# Patient Record
Sex: FEMALE | HISPANIC OR LATINO | URBAN - METROPOLITAN AREA
[De-identification: names, ages, dates, MRNs, and addresses within clinical notes are randomized per-mention and may not be internally consistent; named-entity substitution may affect disease eponyms.]

---

## 2019-03-24 ENCOUNTER — OUTPATIENT (OUTPATIENT)
Dept: INPATIENT UNIT | Facility: HOSPITAL | Age: 34
LOS: 1 days | Discharge: HOME | End: 2019-03-24
Payer: MEDICAID

## 2019-03-24 LAB
APPEARANCE UR: CLEAR — SIGNIFICANT CHANGE UP
BACTERIA # UR AUTO: ABNORMAL
BILIRUB UR-MCNC: NEGATIVE — SIGNIFICANT CHANGE UP
COLOR SPEC: YELLOW — SIGNIFICANT CHANGE UP
DIFF PNL FLD: ABNORMAL
EPI CELLS # UR: ABNORMAL
GLUCOSE UR QL: NEGATIVE MG/DL — SIGNIFICANT CHANGE UP
KETONES UR-MCNC: NEGATIVE — SIGNIFICANT CHANGE UP
LEUKOCYTE ESTERASE UR-ACNC: ABNORMAL
NITRITE UR-MCNC: NEGATIVE — SIGNIFICANT CHANGE UP
PH UR: 7 — SIGNIFICANT CHANGE UP (ref 5–8)
PROT UR-MCNC: NEGATIVE MG/DL — SIGNIFICANT CHANGE UP
RBC CASTS # UR COMP ASSIST: SIGNIFICANT CHANGE UP /HPF (ref 0–4)
SP GR SPEC: 1.01 — SIGNIFICANT CHANGE UP (ref 1.01–1.02)
UROBILINOGEN FLD QL: NEGATIVE MG/DL — SIGNIFICANT CHANGE UP
WBC UR QL: SIGNIFICANT CHANGE UP

## 2019-03-24 PROCEDURE — 99213 OFFICE O/P EST LOW 20 MIN: CPT

## 2019-04-05 DIAGNOSIS — O47.9 FALSE LABOR, UNSPECIFIED: ICD-10-CM

## 2019-04-08 ENCOUNTER — RESULT REVIEW (OUTPATIENT)
Age: 34
End: 2019-04-08

## 2019-04-16 PROBLEM — Z00.00 ENCOUNTER FOR PREVENTIVE HEALTH EXAMINATION: Status: ACTIVE | Noted: 2019-04-16

## 2019-04-19 ENCOUNTER — ASOB RESULT (OUTPATIENT)
Age: 34
End: 2019-04-19

## 2019-04-19 ENCOUNTER — APPOINTMENT (OUTPATIENT)
Dept: ANTEPARTUM | Facility: CLINIC | Age: 34
End: 2019-04-19
Payer: MEDICAID

## 2019-04-19 PROCEDURE — 76805 OB US >/= 14 WKS SNGL FETUS: CPT

## 2019-05-14 ENCOUNTER — APPOINTMENT (OUTPATIENT)
Dept: ANTEPARTUM | Facility: CLINIC | Age: 34
End: 2019-05-14
Payer: MEDICAID

## 2019-05-14 ENCOUNTER — APPOINTMENT (OUTPATIENT)
Dept: ANTEPARTUM | Facility: CLINIC | Age: 34
End: 2019-05-14

## 2019-05-14 ENCOUNTER — ASOB RESULT (OUTPATIENT)
Age: 34
End: 2019-05-14

## 2019-05-14 PROCEDURE — 99241 OFFICE CONSULTATION NEW/ESTAB PATIENT 15 MIN: CPT | Mod: 25

## 2019-05-14 PROCEDURE — 76816 OB US FOLLOW-UP PER FETUS: CPT

## 2019-05-21 ENCOUNTER — APPOINTMENT (OUTPATIENT)
Dept: ANTEPARTUM | Facility: CLINIC | Age: 34
End: 2019-05-21
Payer: MEDICAID

## 2019-05-21 ENCOUNTER — ASOB RESULT (OUTPATIENT)
Age: 34
End: 2019-05-21

## 2019-05-21 PROCEDURE — 76818 FETAL BIOPHYS PROFILE W/NST: CPT

## 2019-05-28 ENCOUNTER — ASOB RESULT (OUTPATIENT)
Age: 34
End: 2019-05-28

## 2019-05-28 ENCOUNTER — APPOINTMENT (OUTPATIENT)
Dept: ANTEPARTUM | Facility: CLINIC | Age: 34
End: 2019-05-28
Payer: MEDICAID

## 2019-05-28 PROCEDURE — 76816 OB US FOLLOW-UP PER FETUS: CPT

## 2019-05-28 PROCEDURE — 76819 FETAL BIOPHYS PROFIL W/O NST: CPT

## 2019-05-28 PROCEDURE — 93976 VASCULAR STUDY: CPT

## 2019-05-28 PROCEDURE — 76820 UMBILICAL ARTERY ECHO: CPT

## 2019-05-28 PROCEDURE — 76821 MIDDLE CEREBRAL ARTERY ECHO: CPT

## 2019-06-02 ENCOUNTER — TRANSCRIPTION ENCOUNTER (OUTPATIENT)
Age: 34
End: 2019-06-02

## 2019-06-02 ENCOUNTER — INPATIENT (INPATIENT)
Facility: HOSPITAL | Age: 34
LOS: 1 days | Discharge: HOME | End: 2019-06-04
Attending: OBSTETRICS & GYNECOLOGY | Admitting: OBSTETRICS & GYNECOLOGY

## 2019-06-02 VITALS — WEIGHT: 132.28 LBS

## 2019-06-02 LAB
ABO RH CONFIRMATION: SIGNIFICANT CHANGE UP
BASOPHILS # BLD AUTO: 0.04 K/UL — SIGNIFICANT CHANGE UP (ref 0–0.2)
BASOPHILS NFR BLD AUTO: 0.3 % — SIGNIFICANT CHANGE UP (ref 0–2)
BLD GP AB SCN SERPL QL: SIGNIFICANT CHANGE UP
EOSINOPHIL # BLD AUTO: 0.06 K/UL — SIGNIFICANT CHANGE UP (ref 0–0.5)
EOSINOPHIL NFR BLD AUTO: 0.4 % — SIGNIFICANT CHANGE UP (ref 0–6)
GLUCOSE BLDC GLUCOMTR-MCNC: 108 MG/DL — HIGH (ref 70–99)
GLUCOSE BLDC GLUCOMTR-MCNC: 89 MG/DL — SIGNIFICANT CHANGE UP (ref 70–99)
GLUCOSE SERPL-MCNC: 98 MG/DL — SIGNIFICANT CHANGE UP (ref 70–99)
HBV SURFACE AG SERPL QL IA: SIGNIFICANT CHANGE UP
HCT VFR BLD CALC: 41 % — SIGNIFICANT CHANGE UP (ref 34.5–45)
HGB BLD-MCNC: 14.1 G/DL — SIGNIFICANT CHANGE UP (ref 11.5–15.5)
HIV 1 & 2 AB SERPL IA.RAPID: SIGNIFICANT CHANGE UP
IMM GRANULOCYTES NFR BLD AUTO: 0.6 % — SIGNIFICANT CHANGE UP (ref 0–1.5)
LYMPHOCYTES # BLD AUTO: 1.94 K/UL — SIGNIFICANT CHANGE UP (ref 1–3.3)
LYMPHOCYTES # BLD AUTO: 14.2 % — SIGNIFICANT CHANGE UP (ref 13–44)
MCHC RBC-ENTMCNC: 31.2 PG — SIGNIFICANT CHANGE UP (ref 27–34)
MCHC RBC-ENTMCNC: 34.4 GM/DL — SIGNIFICANT CHANGE UP (ref 32–36)
MCV RBC AUTO: 90.7 FL — SIGNIFICANT CHANGE UP (ref 80–100)
MONOCYTES # BLD AUTO: 1.03 K/UL — HIGH (ref 0–0.9)
MONOCYTES NFR BLD AUTO: 7.6 % — SIGNIFICANT CHANGE UP (ref 2–14)
NEUTROPHILS # BLD AUTO: 10.49 K/UL — HIGH (ref 1.8–7.4)
NEUTROPHILS NFR BLD AUTO: 76.9 % — SIGNIFICANT CHANGE UP (ref 43–77)
PLATELET # BLD AUTO: 172 K/UL — SIGNIFICANT CHANGE UP (ref 150–400)
RBC # BLD: 4.52 M/UL — SIGNIFICANT CHANGE UP (ref 3.8–5.2)
RBC # FLD: 13.8 % — SIGNIFICANT CHANGE UP (ref 10.3–14.5)
TYPE + AB SCN PNL BLD: SIGNIFICANT CHANGE UP
WBC # BLD: 13.64 K/UL — HIGH (ref 3.8–10.5)
WBC # FLD AUTO: 13.64 K/UL — HIGH (ref 3.8–10.5)

## 2019-06-02 RX ORDER — OXYTOCIN 10 UNIT/ML
333.33 VIAL (ML) INJECTION
Qty: 20 | Refills: 0 | Status: COMPLETED | OUTPATIENT
Start: 2019-06-02 | End: 2019-06-02

## 2019-06-02 RX ORDER — IBUPROFEN 200 MG
600 TABLET ORAL EVERY 6 HOURS
Refills: 0 | Status: COMPLETED | OUTPATIENT
Start: 2019-06-02 | End: 2020-04-30

## 2019-06-02 RX ORDER — OXYTOCIN 10 UNIT/ML
333.33 VIAL (ML) INJECTION
Qty: 20 | Refills: 0 | Status: DISCONTINUED | OUTPATIENT
Start: 2019-06-02 | End: 2019-06-02

## 2019-06-02 RX ORDER — PRAMOXINE HYDROCHLORIDE 150 MG/15G
1 AEROSOL, FOAM RECTAL EVERY 4 HOURS
Refills: 0 | Status: DISCONTINUED | OUTPATIENT
Start: 2019-06-02 | End: 2019-06-04

## 2019-06-02 RX ORDER — SODIUM CHLORIDE 9 MG/ML
1000 INJECTION, SOLUTION INTRAVENOUS
Refills: 0 | Status: DISCONTINUED | OUTPATIENT
Start: 2019-06-02 | End: 2019-06-04

## 2019-06-02 RX ORDER — OXYCODONE HYDROCHLORIDE 5 MG/1
5 TABLET ORAL
Refills: 0 | Status: DISCONTINUED | OUTPATIENT
Start: 2019-06-02 | End: 2019-06-04

## 2019-06-02 RX ORDER — AER TRAVELER 0.5 G/1
1 SOLUTION RECTAL; TOPICAL EVERY 4 HOURS
Refills: 0 | Status: DISCONTINUED | OUTPATIENT
Start: 2019-06-02 | End: 2019-06-04

## 2019-06-02 RX ORDER — GLYCERIN ADULT
1 SUPPOSITORY, RECTAL RECTAL AT BEDTIME
Refills: 0 | Status: DISCONTINUED | OUTPATIENT
Start: 2019-06-02 | End: 2019-06-04

## 2019-06-02 RX ORDER — SIMETHICONE 80 MG/1
80 TABLET, CHEWABLE ORAL EVERY 4 HOURS
Refills: 0 | Status: DISCONTINUED | OUTPATIENT
Start: 2019-06-02 | End: 2019-06-04

## 2019-06-02 RX ORDER — HYDROCORTISONE 1 %
1 OINTMENT (GRAM) TOPICAL EVERY 6 HOURS
Refills: 0 | Status: DISCONTINUED | OUTPATIENT
Start: 2019-06-02 | End: 2019-06-04

## 2019-06-02 RX ORDER — SODIUM CHLORIDE 9 MG/ML
3 INJECTION INTRAMUSCULAR; INTRAVENOUS; SUBCUTANEOUS EVERY 8 HOURS
Refills: 0 | Status: DISCONTINUED | OUTPATIENT
Start: 2019-06-02 | End: 2019-06-04

## 2019-06-02 RX ORDER — OXYCODONE HYDROCHLORIDE 5 MG/1
5 TABLET ORAL ONCE
Refills: 0 | Status: DISCONTINUED | OUTPATIENT
Start: 2019-06-02 | End: 2019-06-04

## 2019-06-02 RX ORDER — OXYTOCIN 10 UNIT/ML
2 VIAL (ML) INJECTION
Qty: 30 | Refills: 0 | Status: DISCONTINUED | OUTPATIENT
Start: 2019-06-02 | End: 2019-06-04

## 2019-06-02 RX ORDER — AMPICILLIN TRIHYDRATE 250 MG
1 CAPSULE ORAL EVERY 4 HOURS
Refills: 0 | Status: DISCONTINUED | OUTPATIENT
Start: 2019-06-02 | End: 2019-06-02

## 2019-06-02 RX ORDER — AMPICILLIN TRIHYDRATE 250 MG
2 CAPSULE ORAL ONCE
Refills: 0 | Status: DISCONTINUED | OUTPATIENT
Start: 2019-06-02 | End: 2019-06-02

## 2019-06-02 RX ORDER — DIPHENHYDRAMINE HCL 50 MG
25 CAPSULE ORAL EVERY 6 HOURS
Refills: 0 | Status: DISCONTINUED | OUTPATIENT
Start: 2019-06-02 | End: 2019-06-04

## 2019-06-02 RX ORDER — TETANUS TOXOID, REDUCED DIPHTHERIA TOXOID AND ACELLULAR PERTUSSIS VACCINE, ADSORBED 5; 2.5; 8; 8; 2.5 [IU]/.5ML; [IU]/.5ML; UG/.5ML; UG/.5ML; UG/.5ML
0.5 SUSPENSION INTRAMUSCULAR ONCE
Refills: 0 | Status: DISCONTINUED | OUTPATIENT
Start: 2019-06-02 | End: 2019-06-04

## 2019-06-02 RX ORDER — SODIUM CHLORIDE 9 MG/ML
1000 INJECTION, SOLUTION INTRAVENOUS
Refills: 0 | Status: DISCONTINUED | OUTPATIENT
Start: 2019-06-02 | End: 2019-06-02

## 2019-06-02 RX ORDER — DIBUCAINE 1 %
1 OINTMENT (GRAM) RECTAL EVERY 6 HOURS
Refills: 0 | Status: DISCONTINUED | OUTPATIENT
Start: 2019-06-02 | End: 2019-06-04

## 2019-06-02 RX ORDER — DOCUSATE SODIUM 100 MG
100 CAPSULE ORAL
Refills: 0 | Status: DISCONTINUED | OUTPATIENT
Start: 2019-06-02 | End: 2019-06-04

## 2019-06-02 RX ORDER — BENZOCAINE 10 %
1 GEL (GRAM) MUCOUS MEMBRANE EVERY 6 HOURS
Refills: 0 | Status: DISCONTINUED | OUTPATIENT
Start: 2019-06-02 | End: 2019-06-04

## 2019-06-02 RX ORDER — KETOROLAC TROMETHAMINE 30 MG/ML
30 SYRINGE (ML) INJECTION ONCE
Refills: 0 | Status: DISCONTINUED | OUTPATIENT
Start: 2019-06-02 | End: 2019-06-02

## 2019-06-02 RX ORDER — SODIUM CHLORIDE 9 MG/ML
1000 INJECTION INTRAMUSCULAR; INTRAVENOUS; SUBCUTANEOUS
Refills: 0 | Status: DISCONTINUED | OUTPATIENT
Start: 2019-06-02 | End: 2019-06-04

## 2019-06-02 RX ORDER — LANOLIN
1 OINTMENT (GRAM) TOPICAL EVERY 6 HOURS
Refills: 0 | Status: DISCONTINUED | OUTPATIENT
Start: 2019-06-02 | End: 2019-06-04

## 2019-06-02 RX ORDER — ACETAMINOPHEN 500 MG
975 TABLET ORAL EVERY 6 HOURS
Refills: 0 | Status: DISCONTINUED | OUTPATIENT
Start: 2019-06-02 | End: 2019-06-04

## 2019-06-02 RX ORDER — CITRIC ACID/SODIUM CITRATE 300-500 MG
15 SOLUTION, ORAL ORAL EVERY 6 HOURS
Refills: 0 | Status: DISCONTINUED | OUTPATIENT
Start: 2019-06-02 | End: 2019-06-02

## 2019-06-02 RX ORDER — MAGNESIUM HYDROXIDE 400 MG/1
30 TABLET, CHEWABLE ORAL
Refills: 0 | Status: DISCONTINUED | OUTPATIENT
Start: 2019-06-02 | End: 2019-06-04

## 2019-06-02 RX ADMIN — Medication 2 MILLIUNIT(S)/MIN: at 11:38

## 2019-06-02 RX ADMIN — Medication 1000 MILLIUNIT(S)/MIN: at 18:44

## 2019-06-02 RX ADMIN — SODIUM CHLORIDE 125 MILLILITER(S): 9 INJECTION, SOLUTION INTRAVENOUS at 11:39

## 2019-06-02 RX ADMIN — SODIUM CHLORIDE 125 MILLILITER(S): 9 INJECTION, SOLUTION INTRAVENOUS at 17:00

## 2019-06-02 RX ADMIN — Medication 30 MILLIGRAM(S): at 20:05

## 2019-06-02 RX ADMIN — SODIUM CHLORIDE 3 MILLILITER(S): 9 INJECTION INTRAMUSCULAR; INTRAVENOUS; SUBCUTANEOUS at 22:19

## 2019-06-02 RX ADMIN — Medication 975 MILLIGRAM(S): at 23:59

## 2019-06-03 LAB
HCT VFR BLD CALC: 31.7 % — LOW (ref 34.5–45)
HGB BLD-MCNC: 11 G/DL — LOW (ref 11.5–15.5)
RUBV IGG SER-ACNC: 2.4 INDEX — SIGNIFICANT CHANGE UP
RUBV IGG SER-IMP: POSITIVE — SIGNIFICANT CHANGE UP
T PALLIDUM AB TITR SER: NEGATIVE — SIGNIFICANT CHANGE UP

## 2019-06-03 RX ORDER — IBUPROFEN 200 MG
600 TABLET ORAL EVERY 6 HOURS
Refills: 0 | Status: DISCONTINUED | OUTPATIENT
Start: 2019-06-03 | End: 2019-06-04

## 2019-06-03 RX ADMIN — SODIUM CHLORIDE 3 MILLILITER(S): 9 INJECTION INTRAMUSCULAR; INTRAVENOUS; SUBCUTANEOUS at 06:19

## 2019-06-03 RX ADMIN — Medication 600 MILLIGRAM(S): at 22:30

## 2019-06-03 RX ADMIN — Medication 600 MILLIGRAM(S): at 08:40

## 2019-06-03 RX ADMIN — Medication 1 TABLET(S): at 15:22

## 2019-06-03 RX ADMIN — Medication 975 MILLIGRAM(S): at 01:08

## 2019-06-03 RX ADMIN — Medication 600 MILLIGRAM(S): at 08:00

## 2019-06-03 RX ADMIN — Medication 600 MILLIGRAM(S): at 15:22

## 2019-06-03 RX ADMIN — Medication 975 MILLIGRAM(S): at 18:13

## 2019-06-03 RX ADMIN — Medication 975 MILLIGRAM(S): at 06:19

## 2019-06-03 RX ADMIN — Medication 600 MILLIGRAM(S): at 16:15

## 2019-06-03 RX ADMIN — Medication 975 MILLIGRAM(S): at 18:56

## 2019-06-03 RX ADMIN — Medication 975 MILLIGRAM(S): at 07:08

## 2019-06-03 RX ADMIN — Medication 600 MILLIGRAM(S): at 21:39

## 2019-06-03 NOTE — PROGRESS NOTE ADULT - SUBJECTIVE AND OBJECTIVE BOX
Postpartum Note Vaginal Delivery  Patient is a 34yo s/p  day 1, FEMALE infant     Subjective:  No acute events overnight.   Patient is tolerating diet and denies N/V.   Patient still has slight vaginal bleeding which is decreasing in amount.   She is breastfeeding and the baby is latching on.    Urinating appropriately.   -BM/+flatus.    Physical exam:  Vital Signs Last 24 Hrs  T(C): 36.7 (2019 04:13), Max: 37.3 (2019 20:20)  T(F): 98.1 (2019 04:13), Max: 99.1 (2019 20:20)  HR: 75 (2019 04:13) (75 - 113)  BP: 102/62 (2019 04:13) (100/54 - 125/60)  RR: 16 (2019 04:13) (16 - 16)  SpO2: 100% (2019 04:13) (99% - 100%)    Heart: RRR  Lungs: CTABL  Breast: non tender, not engorged   Abdomen: Soft, nontender, no distension, firm uterine fundus  Ext: No DVT signs, warm extremities    LABS:                          11.0   x     )-----------( x        ( 2019 07:37 )             31.7 ,                       14.1   13.64 )-----------( 172      ( 2019 06:00 )             41.0

## 2019-06-03 NOTE — PROGRESS NOTE ADULT - ASSESSMENT
Patient is s/p  day# 1  Patient is feeling well and reports no issues.     No complaints.   Continue the current pain medication.   Encourage ambulation and a regular diet.   Discharge according to the normal criteria.

## 2019-06-03 NOTE — PROGRESS NOTE ADULT - ATTENDING COMMENTS
Patient without complaints  Heart s1 s2, no murmur  Lungs clear bilateral  Abdomen - uterus well contracted  Normal lochial flow  No calf tenderness  A/P- PPD#1 s/p vaginal delivery  Ambulate

## 2019-06-04 ENCOUNTER — APPOINTMENT (OUTPATIENT)
Dept: ANTEPARTUM | Facility: CLINIC | Age: 34
End: 2019-06-04

## 2019-06-04 ENCOUNTER — TRANSCRIPTION ENCOUNTER (OUTPATIENT)
Age: 34
End: 2019-06-04

## 2019-06-04 VITALS
HEART RATE: 88 BPM | DIASTOLIC BLOOD PRESSURE: 76 MMHG | RESPIRATION RATE: 17 BRPM | OXYGEN SATURATION: 100 % | SYSTOLIC BLOOD PRESSURE: 115 MMHG | TEMPERATURE: 98 F

## 2019-06-04 RX ORDER — DOCUSATE SODIUM 100 MG
1 CAPSULE ORAL
Qty: 10 | Refills: 0
Start: 2019-06-04 | End: 2019-06-08

## 2019-06-04 RX ORDER — IBUPROFEN 200 MG
1 TABLET ORAL
Qty: 28 | Refills: 0
Start: 2019-06-04 | End: 2019-06-10

## 2019-06-04 RX ADMIN — Medication 600 MILLIGRAM(S): at 07:17

## 2019-06-04 RX ADMIN — Medication 600 MILLIGRAM(S): at 06:34

## 2019-06-04 NOTE — DISCHARGE NOTE OB - CARE PROVIDER_API CALL
Court French)  Obstetrics and Gynecology  [address] [place] [address]  Phone: [phone]  Fax: [phone]  Follow Up Time:

## 2019-06-04 NOTE — DISCHARGE NOTE OB - MEDICATION SUMMARY - MEDICATIONS TO TAKE
I will START or STAY ON the medications listed below when I get home from the hospital:    ibuprofen 600 mg oral tablet  -- 1 tab(s) by mouth every 6 hours, As Needed -for moderate pain   -- Indication: For pain control    Prenatal Multivitamins with Folic Acid 1 mg oral tablet  -- 1 tab(s) by mouth once a day  -- Indication: For maternal wellness    docusate sodium 100 mg oral capsule  -- 1 cap(s) by mouth 2 times a day, As Needed -For stool softening - for constipation   -- Indication: For constipation

## 2019-06-04 NOTE — PROGRESS NOTE ADULT - SUBJECTIVE AND OBJECTIVE BOX
Postpartum Note Vaginal Delivery  Patient is a 34yo s/p  day 2, FEMALE infant     Subjective:  No acute events overnight.   Patient is tolerating diet and denies N/V.   Patient still has slight vaginal bleeding which is decreasing in amount.   She is breastfeeding and the baby is latching on.    Urinating appropriately.   -BM/+flatus.    Physical exam:  Vital Signs Last 24 Hrs  Vital Signs Last 24 Hrs  T(C): 36.9 (2019 21:10), Max: 36.9 (2019 16:45)  T(F): 98.5 (2019 21:10), Max: 98.5 (2019 21:10)  HR: 91 (2019 21:10) (87 - 91)  BP: 115/70 (2019 21:10) (111/62 - 115/70)  RR: 16 (2019 21:10) (16 - 16)  SpO2: 98% (2019 21:10) (98% - 100%)    Heart: RRR  Lungs: CTABL  Breast: non tender, not engorged   Abdomen: Soft, nontender, no distension, firm uterine fundus  Ext: No DVT signs, warm extremities    LABS:                          11.0   x     )-----------( x        ( 2019 07:37 )             31.7 ,                       14.1   13.64 )-----------( 172      ( 2019 06:00 )             41.0

## 2019-06-04 NOTE — DISCHARGE NOTE OB - PLAN OF CARE
delivery and recovery Patient had an uncomplicated hospital course. Patient needs to make an appointment at [facility] within 5-6 weeks for a postpartum appointment. Postpartum precautions were given about bleeding, infection and high blood pressure.

## 2019-06-04 NOTE — DISCHARGE NOTE OB - MATERIALS PROVIDED
Back To Sleep Handout/Guide to Postpartum Care/Vaccinations/[facility]  Screening Program/[facility] Hearing Screen Program/Discharge Medication Information for Patients and Families Pocket Guide

## 2019-06-04 NOTE — DISCHARGE NOTE OB - CARE PLAN
Principal Discharge DX:	 (normal spontaneous vaginal delivery)  Goal:	delivery and recovery  Assessment and plan of treatment:	Patient had an uncomplicated hospital course. Patient needs to make an appointment at [facility] within 5-6 weeks for a postpartum appointment. Postpartum precautions were given about bleeding, infection and high blood pressure.

## 2019-06-04 NOTE — DISCHARGE NOTE OB - PATIENT PORTAL LINK FT
You can access the [facility] Patient Portal, offered by [facility], by registering with the following website: [email or web address]

## 2019-06-04 NOTE — DISCHARGE NOTE OB - HOSPITAL COURSE
Patient had an uncomplicated hospital course after a vaginal delivery of a female infant. Patient needs to make an appointment at [facility] within 5-6 weeks for a postpartum appointment. Postpartum precautions were given about bleeding, infection and high blood pressure.

## 2019-06-04 NOTE — DISCHARGE NOTE OB - ADDITIONAL INSTRUCTIONS
Patient had an uncomplicated hospital course. [facility] a [facility] appointment. Postpartum precautions were given about bleeding, infection and high blood pressure.

## 2019-06-07 DIAGNOSIS — Z3A.39 39 WEEKS GESTATION OF PREGNANCY: ICD-10-CM

## 2019-06-11 ENCOUNTER — APPOINTMENT (OUTPATIENT)
Dept: ANTEPARTUM | Facility: CLINIC | Age: 34
End: 2019-06-11

## 2023-03-09 NOTE — DISCHARGE NOTE OB - YES, WALK AS TOLERATED
Patients wife called in concerned about traveling during upcoming weather, wants to reschedule ERCP for next week. Advised procedure is in 5 days- agreed I'd call them tomorrow to discuss reschedule based on updated weather info rather than doing it today. Wife understands plan.    ML   Statement Selected